# Patient Record
Sex: FEMALE | Race: WHITE | NOT HISPANIC OR LATINO | URBAN - METROPOLITAN AREA
[De-identification: names, ages, dates, MRNs, and addresses within clinical notes are randomized per-mention and may not be internally consistent; named-entity substitution may affect disease eponyms.]

---

## 2019-05-15 ENCOUNTER — EMERGENCY (EMERGENCY)
Facility: HOSPITAL | Age: 24
LOS: 1 days | Discharge: HOME | End: 2019-05-15
Admitting: EMERGENCY MEDICINE
Payer: COMMERCIAL

## 2019-05-15 VITALS
SYSTOLIC BLOOD PRESSURE: 121 MMHG | RESPIRATION RATE: 16 BRPM | OXYGEN SATURATION: 100 % | TEMPERATURE: 98 F | DIASTOLIC BLOOD PRESSURE: 74 MMHG | HEART RATE: 76 BPM

## 2019-05-15 VITALS
OXYGEN SATURATION: 100 % | RESPIRATION RATE: 18 BRPM | SYSTOLIC BLOOD PRESSURE: 136 MMHG | DIASTOLIC BLOOD PRESSURE: 83 MMHG | WEIGHT: 115.96 LBS | TEMPERATURE: 98 F | HEART RATE: 98 BPM

## 2019-05-15 PROCEDURE — 99284 EMERGENCY DEPT VISIT MOD MDM: CPT

## 2019-05-15 RX ORDER — FAMOTIDINE 10 MG/ML
20 INJECTION INTRAVENOUS ONCE
Refills: 0 | Status: COMPLETED | OUTPATIENT
Start: 2019-05-15 | End: 2019-05-15

## 2019-05-15 RX ORDER — DIPHENHYDRAMINE HCL 50 MG
25 CAPSULE ORAL ONCE
Refills: 0 | Status: COMPLETED | OUTPATIENT
Start: 2019-05-15 | End: 2019-05-15

## 2019-05-15 RX ORDER — ONDANSETRON 8 MG/1
4 TABLET, FILM COATED ORAL ONCE
Refills: 0 | Status: COMPLETED | OUTPATIENT
Start: 2019-05-15 | End: 2019-05-15

## 2019-05-15 RX ADMIN — ONDANSETRON 4 MILLIGRAM(S): 8 TABLET, FILM COATED ORAL at 17:42

## 2019-05-15 RX ADMIN — FAMOTIDINE 20 MILLIGRAM(S): 10 INJECTION INTRAVENOUS at 17:10

## 2019-05-15 RX ADMIN — Medication 25 MILLIGRAM(S): at 17:09

## 2019-05-15 RX ADMIN — Medication 125 MILLIGRAM(S): at 17:10

## 2019-05-15 NOTE — ED ADULT NURSE NOTE - OBJECTIVE STATEMENT
c/o throat discomfort after eating lunch with mother that contained nuts; allergies to cashews and walnuts; denies any hives or rashes; did not use epipen; feels throat soreness/discomfort and hoarsiness

## 2019-05-15 NOTE — ED PROVIDER NOTE - OBJECTIVE STATEMENT
23 y/o F with known allergies to nuts presents to ED c/o n/v after eating a dish at a restaurant.  She explains she vomited a moderate amount and her eyes became red.  She took 2 tabs of her grandmothers meclizine prior to arrival.  Pt also has a hoarse voice.  She denies fever, rash, hives, shortness of breath, cough.

## 2019-05-15 NOTE — ED ADULT NURSE NOTE - CHIEF COMPLAINT QUOTE
pt with severe allergy to nuts states she might have ingested nuts around 230pm. took meclizine PTA. c/o abdominal nausea/vomiting, throat tightness and itchiness approx 30 min PTA. +hoarseness in voice noted. speaking in full sentences, airway patent.

## 2019-05-15 NOTE — ED ADULT TRIAGE NOTE - CHIEF COMPLAINT QUOTE
pt with severe allergy to nuts states she might have ingested nuts around 230pm. took meclizine PTA. c/o abdominal nausea/vomiting, throat tightness and itchiness approx 30 min PTA. +hoarseness in voice noted. speaking in full sentences. pt with severe allergy to nuts states she might have ingested nuts around 230pm. took meclizine PTA. c/o abdominal nausea/vomiting, throat tightness and itchiness approx 30 min PTA. +hoarseness in voice noted. speaking in full sentences, airway patent.

## 2019-05-15 NOTE — ED PROVIDER NOTE - CLINICAL SUMMARY MEDICAL DECISION MAKING FREE TEXT BOX
25 y/o F presents to ED c/o allergic reaction of n/v while eating at a restaurant.  Pt well appearing. VSS.  No rash, no oropharyngeal swelling. Pt given IVFS, solumedrol, Pepcid, Zofran and benadryl.  Pt tolerating PO.  Will discharge home. 25 y/o F presents to ED c/o allergic reaction described as n/v while eating at a restaurant.  Pt well appearing. VSS.  No rash, no oropharyngeal swelling. Pt given IVFS, solumedrol, Pepcid, Zofran and benadryl.  Pt tolerating PO.  Will discharge home.

## 2019-05-15 NOTE — ED PROVIDER NOTE - NSFOLLOWUPINSTRUCTIONS_ED_ALL_ED_FT
Hydrate well.  Follow up with primary care provider.  Take Benadryl 25-50 mg every 8 hours as needed for allergic reaction.

## 2019-05-15 NOTE — ED ADULT NURSE NOTE - NSFALLRSKASSESSDT_ED_ALL_ED
"[facility] VOICE [facility]IN[facility]  THERAPY NOTE (CPT 50774)    Patient: Berto Pandey  Date of Service: 10/24/2018  Referring physician: Dr. Dawson  Impressions from most recent evaluation:  \"R05 (Chronic Cough)  R49.0 (Dysphonia)    Laryngeal evaluation demonstrated mild mucosal findings and moderate muscular hyperfunction    Dysphonia/discomfort is accounted for by the hyperfunction of the intrinsic and extrinsic laryngeal musculature      Cough/throat clear are accounted for by the hypersensitivity of the larynx and pharynx as evidenced by case history, patient complaints and absence of other organic findings; hypersensitivity is compounded by imbalance in the function of the intrinsic and extrinsic laryngeal musculature during connected speech\"    SUBJECTIVE:  Since the patient's last session, they report the following:     Overall symptoms are about the same which is expected given no therapeutic suggestions were made at his last visit.    Going to have a PH probe on November 15th    Clarification of goals:    Clearer voice quality    Speaking more clearly and being understood    Reducing cough    OBJECTIVE:  PATIENT REPORTED MEASURES:  Patient Supplied Answers To SLP QOL Questionnaire  Therapy Quality of Life 10/24/2018   Since my l ast session, I used the speech therapy exercises and strategies as recommended by my speech pathologist. Not applicable   I feel that using my therapy techniques has become a habit Not applicable   I feel confident in my ability to manage my current and future symptoms. Agree   Since my last session I feel my symptoms have --------. Stayed the same   Overall, since starting therapy I feel my symptoms are --------. About the same     Patient Specific Goal Metrics:  Dysponia SLP Goals 10/24/2018   How would you rate your speaking voice quality, if 0 is worst voice quality, and 10 is best voice? 5   How much does your voice problem bother you? Very Much   How much does your swallowing problem " bother you?      Quite a bit     THERAPEUTIC ACTIVITIES    Counseling and Education:    Asked many questions about the nature of his symptoms, and I answered all of these thoroughly.    Instructed concepts and techniques for optimal vocal hygiene including:    Systemic hydration, including strategies for increasing daily water intake    Topical hydration - Gargling, saline nasal irrigation, humidification, steam, guaifenesin    Environmental barriers to healthy voicing - noise, inhaled irritants, room acoustics    Smoking cessation  o Was given chantix by his primary doctor, but he hasn't followed through at this point.  He was encouraged to follow-up, and stated that he would like to do so.    Awareness and reduction of phonotraumatic behaviors    Moderating voice use    Substituting non-voice alternative behaviors    Avoiding cough and throat clearing    Chronic cough / throat clearing reduction therapy    Suppression and substitution strategies were instructed including    Swallowing substitution techniques    Breathing suppression techniques to reduce laryngeal tension    Low impact glottic coup and soft cough    Techniques to raise awareness of habitual throat clearing    Additionally he was instructed to keep a log of what circumstances are eliciting cough / throat clear    Semi-Occluded Vocal Tract (SOVT) exercises instructed to reduce laryngeal tension, promote vocal fold pliability, and coordinate respiration and phonation    Straw with water resistance was found to be most facilitating     Sustained phonation, and voice vs. voiceless productions used to promote easy voicing and raise awareness of laryngeal tension    Ascending and descending glides utilized to promote vocal fold pliability    Instructed to use these exercises as a warm-up / cooldown, and to re-calibrate the voice throughout the day.    Markedly reduced roughness and strain is noted with the use of semi-occluded sounds and slight elevation  in F0      A regimen for home practice was instructed.    I provided handouts of today's therapeutic activities to facilitate practice.    ASSESSMENT/PLAN  PROGRESS TOWARD LONG TERM GOALS:   Adequate progress; please see above    IMPRESSIONS: R49.0 (Dysphonia) and R05 (Chronic Cough) in the context of an imbalance in function of the intrinsic and extrinsic muscles of the larynx and nonoptimal for coordination of respiration and phonation. Mr. Pandey was able to achieve reduced roughness and improved clarity of voicing with improved phonatory respiratory coordination and slight elevation in F0.  The need for improved vocal hygiene for reduction of throat irritation and also clarity of voicing was stressed.  Patient also describes a pattern of disrupted clarity and speech, though within conversation this manifested just as often as word finding issues as articulation issues.  This may be an area targeted concurrently with improved vocal clarity; however, I explained that dysarthria/articulation focused work was not a primary area of focus for me, and should voice and cough resolve without improvement in intelligibility a referral would be warranted to a motor speech focused speech pathologist.    PLAN: I will see Mr. Pandey in 2-4 weeks, at which point we will continue to advance phonatory respiratory coordination and introduce conversational training therapy.       TOTAL SERVICE TIME: 60 minutes  TREATMENT (56084): 60 minutes  NO CHARGE FACILITY FEE (34334)    Donell Bowers M.M., M.A., CCC-SLP  Speech-Language Pathologist  Certificate of Vocology  [phone]     15-May-2019 17:56

## 2019-05-19 DIAGNOSIS — R11.2 NAUSEA WITH VOMITING, UNSPECIFIED: ICD-10-CM

## 2019-05-19 DIAGNOSIS — R49.0 DYSPHONIA: ICD-10-CM

## 2021-01-08 NOTE — ED PROVIDER NOTE - MDM ORDERS SUBMITTED SELECTION
Ongoing SW/CM Assessment/Plan of Care Note     See SW/CM flowsheets for goals and other objective data.    Patient/Family discharge goal (s):             PT Recommendation:     Recommendation for Discharge: PT IL: Patient requires 24 hour nonskilled assistance to perform mobility and/or ADLs safely, Patient needs nondaily skilled therapy after discharge    OT Recommendation:          SLP Recommendation:       Disposition:       Progress note:   Home health order received. Home health referral sent via ecin to [facility], awaiting determination. Patient is medicare bundle patient, letter explained to alcides Shepard and placed in chart.          Medications